# Patient Record
Sex: FEMALE | Race: OTHER | NOT HISPANIC OR LATINO
[De-identification: names, ages, dates, MRNs, and addresses within clinical notes are randomized per-mention and may not be internally consistent; named-entity substitution may affect disease eponyms.]

---

## 2023-08-14 ENCOUNTER — APPOINTMENT (OUTPATIENT)
Dept: UROLOGY | Facility: CLINIC | Age: 38
End: 2023-08-14
Payer: COMMERCIAL

## 2023-08-14 VITALS
OXYGEN SATURATION: 98 % | HEART RATE: 75 BPM | WEIGHT: 130 LBS | TEMPERATURE: 98.2 F | DIASTOLIC BLOOD PRESSURE: 77 MMHG | BODY MASS INDEX: 23.92 KG/M2 | SYSTOLIC BLOOD PRESSURE: 130 MMHG | HEIGHT: 62 IN

## 2023-08-14 DIAGNOSIS — Z78.9 OTHER SPECIFIED HEALTH STATUS: ICD-10-CM

## 2023-08-14 DIAGNOSIS — Z80.0 FAMILY HISTORY OF MALIGNANT NEOPLASM OF DIGESTIVE ORGANS: ICD-10-CM

## 2023-08-14 DIAGNOSIS — Z86.59 PERSONAL HISTORY OF OTHER MENTAL AND BEHAVIORAL DISORDERS: ICD-10-CM

## 2023-08-14 DIAGNOSIS — Z87.09 PERSONAL HISTORY OF OTHER DISEASES OF THE RESPIRATORY SYSTEM: ICD-10-CM

## 2023-08-14 DIAGNOSIS — R39.11 HESITANCY OF MICTURITION: ICD-10-CM

## 2023-08-14 DIAGNOSIS — M62.89 OTHER SPECIFIED DISORDERS OF MUSCLE: ICD-10-CM

## 2023-08-14 DIAGNOSIS — Z22.39 CARRIER OF OTHER SPECIFIED BACTERIAL DISEASES: ICD-10-CM

## 2023-08-14 PROBLEM — Z00.00 ENCOUNTER FOR PREVENTIVE HEALTH EXAMINATION: Status: ACTIVE | Noted: 2023-08-14

## 2023-08-14 LAB
BILIRUB UR QL STRIP: NORMAL
CLARITY UR: CLEAR
COLLECTION METHOD: NORMAL
GLUCOSE UR-MCNC: NORMAL
HCG UR QL: 0.2 EU/DL
HGB UR QL STRIP.AUTO: NORMAL
KETONES UR-MCNC: NORMAL
LEUKOCYTE ESTERASE UR QL STRIP: NORMAL
NITRITE UR QL STRIP: NORMAL
PH UR STRIP: 5.5
PROT UR STRIP-MCNC: NORMAL
SP GR UR STRIP: 1

## 2023-08-14 PROCEDURE — 99204 OFFICE O/P NEW MOD 45 MIN: CPT

## 2023-08-14 PROCEDURE — 81003 URINALYSIS AUTO W/O SCOPE: CPT | Mod: QW

## 2023-08-14 RX ORDER — FLUTICASONE PROPIONATE 50 UG/1
SPRAY, METERED NASAL
Refills: 0 | Status: ACTIVE | COMMUNITY

## 2023-08-21 LAB
MYCOPLASMA HOMINIS CULTURE: NEGATIVE
UREAPLASMA CULTURE: NEGATIVE

## 2023-08-21 NOTE — HISTORY OF PRESENT ILLNESS
[FreeTextEntry1] : Language: English Date of First visit: 2023 Accompanied by: *** Contact info: *** Referring Provider/PCP: Dr. Jones ([facility]) Phone [phone] fax: [phone]   Pronoun: THEY    CC/ Problem List:  =============================================================================== FIRST VISIT: The patient is a 38 year male (born biologically female)  who first presents 2023 for a ureaplasma infection.  The patient reports they were having itching and discomfort and urethral swelling. The patient was treated with abx (two different types). The patient thinks things are better but there can be some ebbing and flowing and the patient is worried about having sex with their partner. The patient thought they had discharge from the urethra after taking abx. the patient had a full spectrum of STI tests last year and they are monogamous with their partner.  The patient also has some hesitancy, sensation of incomplete emptying. They deny straining to void. They do not have problems with constipation. They deny pain with intercourse and urgency. They have nocturia 1x/night. They underwent a uterine ablation and they no longer get a period. They had really "unruly" periods.  their mom had a menstrual issue as well that resulted in hysterectomy.  ------------------------------------------------------------------------------------------- INTERVAL VISITS:   ===============================================================================  PMH: Anxiety / depression PSH: Tonsils, Lipoma, Uterine ablation POBH: (if applicable) ; no longer gets period due to ablation FH:   ALL: Tobramycin, mold dander, dust, grass, tree pollen MEDS: Flonase, B12, D3 SOC: No Tob, Social EtOH, Marijuana   ROS: Review of Systems is as per HPI unless otherwise denoted below   =============================================================================== DATA:   LABS:------------------------------------------------------------------------------------------------------------------- 2023: UA dip trace blood, Neg NIT/LE   RADS:-------------------------------------------------------------------------------------------------------------------    PATHOLOGY/CYTOLOGY:-------------------------------------------------------------------------------------------    VOIDING STUDIES: ---------------------------------------------------------------------------------------------------- 2023: PVR 17cc   STONE STUDIES: (Analysis/LLSA)----------------------------------------------------------------------------------    PROCEDURES: -----------------------------------------------------------------------------------------------     ===============================================================================  PHYSICAL EXAM:  GEN: AAOx3, NAD, Habitus: Normal  BARRIERS to CARE: None  PSYCH: Appropriate Behavior, Affect Congruent  HEENT: AT/NC Trachea midline.   Lungs: No labored breathing.  NEURO: + Movement, all 4 extremities grossly intact without deficits. No tremors.  SKIN: Warm dry. No visible rashes or ulcers  GAIT: Gait normal , Stability good  ABD: Soft, NT ND. No rebound, No guarding. No masses.  No suprapubic tenderness,  MSK: No CVAT BL  EXTR: No cyanosis or edema. Normal hair distribution. No venous stasis changes.  LAD: No palpable pre-auricular, submental, submandibular, or cervical LAD.    FOCUSED: -----------------------------------------------------------------------------------------------  In compliance with [facility] policy the patient was offered a chaperone during this exam The patient DID accept a chaperone The person present for this entire exam/procedure as chaperone was: MALU Mcintyre  Bladder: Nondistended, Nontender, No masses  Ext Genitalia: Normal  Urethra: Normal, no masses, no scarring or prolapse; No hypermobility  Vagina: Normal angle, Normal Length; mildly tight levators, NT NT OI; strong kegel, some billowing of perineal body on Valsalva  Cystocele: None  Rectocele: None  =======================================================================================      ASSESSMENT and PLAN   The patient is a 38 year male with a history of the followin. Ureaplasma infection The patient states their infection was symptomatic and has intermittent but much milder symptoms now. They will find out what type of abx they took in the past and we will send their urine for a U/M culture. We can retreat based on results and prior treatment if needed/symptomatic.  2. Urinary hesitancy and sensation of incomplete emptying without constipation or pain with intercourse Their exam showed some somewhat tight levators and a very strong kegel. This could be part of their issue, though it may not be the biggest problem. I recommended PF PT for possible PFD  The patient and I discussed what the pelvic floor is and what pelvic floor dysfunction is.   I explained that the pelvic floor is a group of muscles that links the "front" of your core (abs, obliques) to the "back" of your core (lats, erectors). It is a group of muscles that most people have poor awareness of. It includes (anteriorly) the superficial and deep transverse perineals, the ischiocavernosus, and bulbospongiosus. Posteriorly it includes the levators, pubococcygeus, iliococcygeus, coccygeus, and levators.  Because the urethra, vagina and rectum traverse these muscles, non-relaxation of the pelvic floor can cause vaginal, urinary and bowel issues. Additionally the bladder sits on top of the pelvic floor and can be affected by PF abnormalities. And because these muscles also have bony attachments, PFD can cause lower back pelvic, suprapubic and hip pain.  While many patients have PFD for unclear reasons, some patients have a h/o horseback riding or sexual abuse/assault. In many patients, the condition of PFD may precede symptoms by quite some time.  Treatment for PFD is multimodal. It almost always requires physical therapy and biofeedback. Intravaginal relaxing agents and trigger point injections as well as stress reduction and bowel regimens can be used as adjuncts. Treatment may take quite some time and requires patience: the patient must first learn the muscles affected and then work on relaxing them. There are multiple specialists that can be involved in this treatment.  I have recommended the following for the patient:  . Pelvic Floor PT If the patient does not improve with PF PT and is significantly bothered we can consider UDs since their issues are primarily bladder /voiding related.  The patient understood the above and their questions were answered.    ----------------------------------------------------------------------------------------------------- LABS/TESTS Ordered: Ureaplasma/Myco, PF PT Meds Ordered: Follow up: VV next week -----------------------------------------------------------------------------------------------------   The total amount of time I have personally spent preparing for this visit, reviewing the patient's test results, obtaining external history, ordering tests/medications, documenting clinical information, communicating with and counseling the patient/family and/or caregiver(s), and spent face to face with the patient explaining the above was  50 minutes.  Thank you for allowing me to assist in the care of your patient. Should you have any questions please do not hesitate to reach out to me.     Liana Shelby MD  Department of Urology [facility] Phone: [phone] Fax: [phone] [address] [address]

## 2023-08-24 ENCOUNTER — APPOINTMENT (OUTPATIENT)
Dept: UROLOGY | Facility: CLINIC | Age: 38
End: 2023-08-24

## 2023-08-24 NOTE — HISTORY OF PRESENT ILLNESS
[FreeTextEntry1] : Language: English Date of First visit: 2023 Accompanied by: *** Contact info: *** Referring Provider/PCP: Dr. Jones ([facility]) Phone [phone] fax: [phone]   Pronoun: THEY    CC/ Problem List:  =============================================================================== FIRST VISIT: The patient was a 38 year male (born biologically female) who first presented on 2023 for a ureaplasma infection.  The patient reports they were having itching and discomfort and urethral swelling. The patient was treated with abx (two different types). The patient thinks things are better but there can be some ebbing and flowing and the patient is worried about having sex with their partner. The patient thought they had discharge from the urethra after taking abx. the patient had a full spectrum of STI tests last year and they are monogamous with their partner.  The patient also has some hesitancy, sensation of incomplete emptying. They deny straining to void. They do not have problems with constipation. They deny pain with intercourse and urgency. They have nocturia 1x/night. They underwent a uterine ablation and they no longer get a period. They had really "unruly" periods.  their mom had a menstrual issue as well that resulted in hysterectomy.  ------------------------------------------------------------------------------------------- INTERVAL VISITS:  The patient's age today 2023 is 38 year old. Please note interval events and changes in PMH, PSH, MEDS and ALLERGIES were reviewed. Their ureaplasma/mycoplasma test returned negative.  ===============================================================================  PMH: Anxiety / depression PSH: Tonsils, Lipoma, Uterine ablation POBH: (if applicable) ; no longer gets period due to ablation FH:   ALL: Tobramycin, mold dander, dust, grass, tree pollen MEDS: Flonase, B12, D3 SOC: No Tob, Social EtOH, Marijuana   ROS: Review of Systems is as per HPI unless otherwise denoted below   =============================================================================== DATA:   LABS:------------------------------------------------------------------------------------------------------------------- 2023: UA dip trace blood, Neg NIT/LE   RADS:-------------------------------------------------------------------------------------------------------------------    PATHOLOGY/CYTOLOGY:-------------------------------------------------------------------------------------------    VOIDING STUDIES: ---------------------------------------------------------------------------------------------------- 2023: PVR 17cc   STONE STUDIES: (Analysis/LLSA)----------------------------------------------------------------------------------    PROCEDURES: -----------------------------------------------------------------------------------------------     ===============================================================================  PHYSICAL EXAM:  GEN: AAOx3, NAD, Habitus: Normal  BARRIERS to CARE: None  PSYCH: Appropriate Behavior, Affect Congruent  HEENT: AT/NC Trachea midline.   Lungs: No labored breathing.  NEURO: + Movement, all 4 extremities grossly intact without deficits. No tremors.  SKIN: Warm dry. No visible rashes or ulcers  GAIT: Gait normal , Stability good  ABD: Soft, NT ND. No rebound, No guarding. No masses.  No suprapubic tenderness,  MSK: No CVAT BL  EXTR: No cyanosis or edema. Normal hair distribution. No venous stasis changes.  LAD: No palpable pre-auricular, submental, submandibular, or cervical LAD.    FOCUSED: -----------------------------------------------------------------------------------------------  In compliance with [facility] policy the patient was offered a chaperone during this exam The patient DID accept a chaperone The person present for this entire exam/procedure as chaperone was: MALU Mcintyre  Bladder: Nondistended, Nontender, No masses  Ext Genitalia: Normal  Urethra: Normal, no masses, no scarring or prolapse; No hypermobility  Vagina: Normal angle, Normal Length; mildly tight levators, NT NT OI; strong kegel, some billowing of perineal body on Valsalva  Cystocele: None  Rectocele: None  =======================================================================================      ASSESSMENT and PLAN   The patient is a 38 year male with a history of the followin. Ureaplasma infection The patient states their infection was symptomatic and has intermittent but much milder symptoms now. They will find out what type of abx they took in the past and we will send their urine for a U/M culture. We can retreat based on results and prior treatment if needed/symptomatic.  2. Urinary hesitancy and sensation of incomplete emptying without constipation or pain with intercourse Their exam showed some somewhat tight levators and a very strong kegel. This could be part of their issue, though it may not be the biggest problem. I recommended PF PT for possible PFD  The patient and I discussed what the pelvic floor is and what pelvic floor dysfunction is.   I explained that the pelvic floor is a group of muscles that links the "front" of your core (abs, obliques) to the "back" of your core (lats, erectors). It is a group of muscles that most people have poor awareness of. It includes (anteriorly) the superficial and deep transverse perineals, the ischiocavernosus, and bulbospongiosus. Posteriorly it includes the levators, pubococcygeus, iliococcygeus, coccygeus, and levators.  Because the urethra, vagina and rectum traverse these muscles, non-relaxation of the pelvic floor can cause vaginal, urinary and bowel issues. Additionally the bladder sits on top of the pelvic floor and can be affected by PF abnormalities. And because these muscles also have bony attachments, PFD can cause lower back pelvic, suprapubic and hip pain.  While many patients have PFD for unclear reasons, some patients have a h/o horseback riding or sexual abuse/assault. In many patients, the condition of PFD may precede symptoms by quite some time.  Treatment for PFD is multimodal. It almost always requires physical therapy and biofeedback. Intravaginal relaxing agents and trigger point injections as well as stress reduction and bowel regimens can be used as adjuncts. Treatment may take quite some time and requires patience: the patient must first learn the muscles affected and then work on relaxing them. There are multiple specialists that can be involved in this treatment.  I have recommended the following for the patient:  . Pelvic Floor PT If the patient does not improve with PF PT and is significantly bothered we can consider UDs since their issues are primarily bladder /voiding related.  The patient understood the above and their questions were answered.    ----------------------------------------------------------------------------------------------------- LABS/TESTS Ordered: Ureaplasma/Myco, PF PT Meds Ordered: Follow up: VV next week -----------------------------------------------------------------------------------------------------   The total amount of time I have personally spent preparing for this visit, reviewing the patient's test results, obtaining external history, ordering tests/medications, documenting clinical information, communicating with and counseling the patient/family and/or caregiver(s), and spent face to face with the patient explaining the above was  50 minutes.  Thank you for allowing me to assist in the care of your patient. Should you have any questions please do not hesitate to reach out to me.     Liana Shelby MD  Department of Urology [facility] Phone: [phone] Fax: [phone] [address] [address]

## 2023-09-03 ENCOUNTER — TRANSCRIPTION ENCOUNTER (OUTPATIENT)
Age: 38
End: 2023-09-03

## 2023-09-29 ENCOUNTER — APPOINTMENT (OUTPATIENT)
Dept: NEUROLOGY | Facility: CLINIC | Age: 38
End: 2023-09-29

## 2023-11-02 ENCOUNTER — APPOINTMENT (OUTPATIENT)
Dept: NEUROLOGY | Facility: CLINIC | Age: 38
End: 2023-11-02
Payer: COMMERCIAL

## 2023-11-02 VITALS
HEIGHT: 62 IN | TEMPERATURE: 98 F | SYSTOLIC BLOOD PRESSURE: 129 MMHG | DIASTOLIC BLOOD PRESSURE: 80 MMHG | HEART RATE: 61 BPM | OXYGEN SATURATION: 99 % | BODY MASS INDEX: 24.84 KG/M2 | WEIGHT: 135 LBS

## 2023-11-02 DIAGNOSIS — D32.9 BENIGN NEOPLASM OF MENINGES, UNSPECIFIED: ICD-10-CM

## 2023-11-02 PROCEDURE — 99204 OFFICE O/P NEW MOD 45 MIN: CPT

## 2023-11-06 ENCOUNTER — TRANSCRIPTION ENCOUNTER (OUTPATIENT)
Age: 38
End: 2023-11-06

## 2023-11-06 RX ORDER — SUMATRIPTAN 25 MG/1
25 TABLET, FILM COATED ORAL
Qty: 9 | Refills: 3 | Status: ACTIVE | COMMUNITY
Start: 2023-11-02 | End: 1900-01-01

## 2023-11-10 ENCOUNTER — APPOINTMENT (OUTPATIENT)
Dept: MRI IMAGING | Facility: HOSPITAL | Age: 38
End: 2023-11-10

## 2023-11-10 ENCOUNTER — OUTPATIENT (OUTPATIENT)
Dept: OUTPATIENT SERVICES | Facility: HOSPITAL | Age: 38
LOS: 1 days | End: 2023-11-10
Payer: COMMERCIAL

## 2023-11-10 PROCEDURE — 70553 MRI BRAIN STEM W/O & W/DYE: CPT | Mod: 26

## 2023-11-10 PROCEDURE — 70553 MRI BRAIN STEM W/O & W/DYE: CPT

## 2023-11-10 PROCEDURE — A9585: CPT

## 2023-11-20 ENCOUNTER — TRANSCRIPTION ENCOUNTER (OUTPATIENT)
Age: 38
End: 2023-11-20

## 2023-11-27 ENCOUNTER — NON-APPOINTMENT (OUTPATIENT)
Age: 38
End: 2023-11-27

## 2023-11-27 ENCOUNTER — TRANSCRIPTION ENCOUNTER (OUTPATIENT)
Age: 38
End: 2023-11-27

## 2023-12-11 ENCOUNTER — APPOINTMENT (OUTPATIENT)
Dept: NEUROLOGY | Facility: CLINIC | Age: 38
End: 2023-12-11

## 2024-03-04 ENCOUNTER — APPOINTMENT (OUTPATIENT)
Dept: NEUROLOGY | Facility: CLINIC | Age: 39
End: 2024-03-04
Payer: COMMERCIAL

## 2024-03-04 VITALS
HEART RATE: 84 BPM | TEMPERATURE: 98.8 F | SYSTOLIC BLOOD PRESSURE: 133 MMHG | OXYGEN SATURATION: 100 % | BODY MASS INDEX: 24.84 KG/M2 | WEIGHT: 135 LBS | HEIGHT: 62 IN | DIASTOLIC BLOOD PRESSURE: 82 MMHG

## 2024-03-04 DIAGNOSIS — G43.E09 CHRONIC MIGRAINE WITH AURA, NOT INTRACTABLE, WITHOUT STATUS MIGRAINOSUS: ICD-10-CM

## 2024-03-04 PROCEDURE — G2211 COMPLEX E/M VISIT ADD ON: CPT

## 2024-03-04 PROCEDURE — 99213 OFFICE O/P EST LOW 20 MIN: CPT

## 2024-03-04 NOTE — DISCUSSION/SUMMARY
[FreeTextEntry1] : 37 yo person with seasonal allergies, migraines presents for management of chronic migraine w/ aura   plan: Amitriptyline 10mg/night as preventative headache medication  Trial of sumatriptan PRN  MRI brain w/wout contrast to reassess meningioma Discussed lifestyle factors and increased stroke risk F/u in 4-5 months or sooner if needed

## 2024-03-04 NOTE — DATA REVIEWED
[de-identified] : INTERPRETATION:  CLINICAL INDICATIONS: hx of meningioma;, migraines with aura  COMPARISON: None.  TECHNIQUE: MRI brain: Multiplanar, multisequence MR imaging of the brain are obtained with and without the administration of 6. cc intravenous Gadavist contrast. 1.0 cc of contrast was discarded  FINDINGS: No abnormal leptomeningeal or parenchymal enhancement. Moderate size cava septum pellucidum and cavum vergae. No vasogenic edema. There is no abnormal restricted diffusion to suggest acute infarction. No abnormal signal is demonstrated throughout the brain parenchyma. Normal T2 flow-voids are seen within  the intracranial vasculature. The lateral ventricles and cortical sulci are otherwise age-appropriate in size and configuration. There is no mass, mass effect, or extra-axial fluid collection. There is no susceptibility artifact to suggest hemorrhage. Midline structures are normal.  The visualized paranasal sinuses, mastoid air cells and orbits are unremarkable.   IMPRESSION: Unremarkable MRI of the brain.  --- End of Report ---      GRETCHEN GALVAN MD; Attending Radiologist This document has been electronically signed. Nov 11 2023 10:43AM	 		 		 _________________________	 Exam requested by: DR MECHELLE MEDINA MD [address] [place] [address] SITE PERFORMED: [facility] PHONE: [phone] Patient: ANGELINE VERDUGO YOB: 1985 Phone: [phone] MRN: [id number] Acc: [id number] Date of Exam: 09-   EXAM:  MRI BRAIN WITHOUT CONTRAST  HISTORY:  Headaches   TECHNIQUE:  MRI of the brain was performed utilizing multiplanar sequences.  COMPARISON:  There are no prior studies available for comparison.  FINDINGS:   The ventricles and sulci are normal in size and configuration. There is a cavum septum pellucida and vergae, consistent with normal anatomic variation. There is no acute intracranial hemorrhage, extra-axial fluid collection, acute infarct or intraparenchymal mass lesion. There is a small calcification versus calcified meningioma along the high left tentorial leaflet.  The arterial flow voids at the skull base are unremarkable. The pituitary gland is not enlarged. The cerebellar tonsils are normal in position.  IMPRESSION:  No acute intracranial hemorrhage, extra-axial fluid collection, acute infarct or intraparenchymal mass lesion.   Small calcification versus calcified meningioma along the high left tentorial leaflet.  Thank you for the opportunity to participate in the care of this patient.     RADHA GAYTAN MD  - Electronically Signed: 09- 2:46 PM  Physician to Physician Direct Line is: [phone]  	 Exam requested by: DR MECHELLE MEDINA MD [address] [place] [address] SITE PERFORMED: [facility] PHONE: [phone] Patient: ANGELINE VERDUGO YOB: 1985 Phone: [phone] MRN: [id number] Acc: [id number] Date of Exam: 09-   EXAM:  MRI BRAIN WITHOUT CONTRAST  HISTORY:  Headaches   TECHNIQUE:  MRI of the brain was performed utilizing multiplanar sequences.  COMPARISON:  There are no prior studies available for comparison.  FINDINGS:   The ventricles and sulci are normal in size and configuration. There is a cavum septum pellucida and vergae, consistent with normal anatomic variation. There is no acute intracranial hemorrhage, extra-axial fluid collection, acute infarct or intraparenchymal mass lesion. There is a small calcification versus calcified meningioma along the high left tentorial leaflet.  The arterial flow voids at the skull base are unremarkable. The pituitary gland is not enlarged. The cerebellar tonsils are normal in position.  IMPRESSION:  No acute intracranial hemorrhage, extra-axial fluid collection, acute infarct or intraparenchymal mass lesion.   Small calcification versus calcified meningioma along the high left tentorial leaflet.  Thank you for the opportunity to participate in the care of this patient.     RADHA GAYTAN MD  - Electronically Signed: 09- 2:46 PM  Physician to Physician Direct Line is: [phone] 	 	 Exam requested by: DR MECHELLE MEDINA MD [address] [place], [place] [id number] SITE PERFORMED: [facility] PHONE: [phone] Patient: ANGELINE VERDUGO YOB: 1985 Phone: [phone] MRN: [id number] Acc: [id number] Date of Exam: 09-   EXAM:  MRI BRAIN WITHOUT CONTRAST  HISTORY:  Headaches   TECHNIQUE:  MRI of the brain was performed utilizing multiplanar sequences.  COMPARISON:  There are no prior studies available for comparison.  FINDINGS:   The ventricles and sulci are normal in size and configuration. There is a cavum septum pellucida and vergae, consistent with normal anatomic variation. There is no acute intracranial hemorrhage, extra-axial fluid collection, acute infarct or intraparenchymal mass lesion. There is a small calcification versus calcified meningioma along the high left tentorial leaflet.  The arterial flow voids at the skull base are unremarkable. The pituitary gland is not enlarged. The cerebellar tonsils are normal in position.  IMPRESSION:  No acute intracranial hemorrhage, extra-axial fluid collection, acute infarct or intraparenchymal mass lesion.   Small calcification versus calcified meningioma along the high left tentorial leaflet.  Thank you for the opportunity to participate in the care of this patient.     RADHA GAYTAN MD  - Electronically Signed: 09- 2:46 PM  Physician to Physician Direct Line is: [phone]

## 2024-03-04 NOTE — HISTORY OF PRESENT ILLNESS
[FreeTextEntry1] : Interim Hx: 3/4/2024  Doing well. Started amitriptyline 10mg/night, no side effects. Migraines have been infrequent, has not needed sumatriptan  MRI brain completed  ________________ Initial Hx: 11/2/2023 39 yo person with seasonal allergies, migraines presents for migraine management pronouns- they/them  migraines started at ~14 years old  variable pain, typically top of head Frequency- migraine 1x/month,  worse in spring and fall, "regular headaches" 3x/week Visual aura- visual obscurations before migraine Duration- hours to 1-2 days a/w light, noise sensitivity, and recently nausea trigger- weather  Excedrin usually helps preventative med trial amitriptyline  for 7 months  which was effective but stopped b/c no longer seeing that neurologist (has been off for about a year)  sleep - avg 7 hours/night water intake- good no skipping meals Stressed++  No known FHx of migraines  Was following with Neurologist in the past MRI brain how showed possible meningioma  Social Hx:  smoke marijuana, occasional alcohol

## 2024-03-04 NOTE — DATA REVIEWED
[de-identified] :  	 		 		 	 Exam requested by: DR MECHELLE MEDINA MD [address] [place] [address] SITE PERFORMED: [facility] PHONE: [phone] Patient: ANGELINE VERDUGO YOB: 1985 Phone: [phone] MRN: [id number] Acc: [id number] Date of Exam: 09-   EXAM:  MRI BRAIN WITHOUT CONTRAST  HISTORY:  Headaches   TECHNIQUE:  MRI of the brain was performed utilizing multiplanar sequences.  COMPARISON:  There are no prior studies available for comparison.  FINDINGS:   The ventricles and sulci are normal in size and configuration. There is a cavum septum pellucida and vergae, consistent with normal anatomic variation. There is no acute intracranial hemorrhage, extra-axial fluid collection, acute infarct or intraparenchymal mass lesion. There is a small calcification versus calcified meningioma along the high left tentorial leaflet.  The arterial flow voids at the skull base are unremarkable. The pituitary gland is not enlarged. The cerebellar tonsils are normal in position.  IMPRESSION:  No acute intracranial hemorrhage, extra-axial fluid collection, acute infarct or intraparenchymal mass lesion.   Small calcification versus calcified meningioma along the high left tentorial leaflet.  Thank you for the opportunity to participate in the care of this patient.     RADHA GAYTAN MD  - Electronically Signed: 09- 2:46 PM  Physician to Physician Direct Line is: [phone]  	 Exam requested by: DR MECHELLE MEDINA MD [address] [place], [place] [id number] SITE PERFORMED: [facility] PHONE: [phone] Patient: ANGELINE VERDUGO YOB: 1985 Phone: [phone] MRN: [id number] Acc: [id number] Date of Exam: 09-   EXAM:  MRI BRAIN WITHOUT CONTRAST  HISTORY:  Headaches   TECHNIQUE:  MRI of the brain was performed utilizing multiplanar sequences.  COMPARISON:  There are no prior studies available for comparison.  FINDINGS:   The ventricles and sulci are normal in size and configuration. There is a cavum septum pellucida and vergae, consistent with normal anatomic variation. There is no acute intracranial hemorrhage, extra-axial fluid collection, acute infarct or intraparenchymal mass lesion. There is a small calcification versus calcified meningioma along the high left tentorial leaflet.  The arterial flow voids at the skull base are unremarkable. The pituitary gland is not enlarged. The cerebellar tonsils are normal in position.  IMPRESSION:  No acute intracranial hemorrhage, extra-axial fluid collection, acute infarct or intraparenchymal mass lesion.   Small calcification versus calcified meningioma along the high left tentorial leaflet.  Thank you for the opportunity to participate in the care of this patient.     RADHA GAYTAN MD  - Electronically Signed: 09- 2:46 PM  Physician to Physician Direct Line is: [phone] 	 	 Exam requested by: DR MECHELLE MEDINA MD [address] [place] [address] SITE PERFORMED: [facility] PHONE: [phone] Patient: ANGELINE VERDUGO YOB: 1985 Phone: [phone] MRN: [id number] Acc: [id number] Date of Exam: 09-   EXAM:  MRI BRAIN WITHOUT CONTRAST  HISTORY:  Headaches   TECHNIQUE:  MRI of the brain was performed utilizing multiplanar sequences.  COMPARISON:  There are no prior studies available for comparison.  FINDINGS:   The ventricles and sulci are normal in size and configuration. There is a cavum septum pellucida and vergae, consistent with normal anatomic variation. There is no acute intracranial hemorrhage, extra-axial fluid collection, acute infarct or intraparenchymal mass lesion. There is a small calcification versus calcified meningioma along the high left tentorial leaflet.  The arterial flow voids at the skull base are unremarkable. The pituitary gland is not enlarged. The cerebellar tonsils are normal in position.  IMPRESSION:  No acute intracranial hemorrhage, extra-axial fluid collection, acute infarct or intraparenchymal mass lesion.   Small calcification versus calcified meningioma along the high left tentorial leaflet.  Thank you for the opportunity to participate in the care of this patient.     RADHA GAYTAN MD  - Electronically Signed: 09- 2:46 PM  Physician to Physician Direct Line is: [phone]

## 2024-03-04 NOTE — PHYSICAL EXAM
[General Appearance - Alert] : alert [General Appearance - In No Acute Distress] : in no acute distress [Oriented To Time, Place, And Person] : oriented to person, place, and time [Person] : oriented to person [Place] : oriented to place [Time] : oriented to time [Fluency] : fluency intact [Cranial Nerves Optic (II)] : visual acuity intact bilaterally,  visual fields full to confrontation, pupils equal round and reactive to light [Cranial Nerves Oculomotor (III)] : extraocular motion intact [Cranial Nerves Trigeminal (V)] : facial sensation intact symmetrically [Cranial Nerves Facial (VII)] : face symmetrical [Cranial Nerves Vestibulocochlear (VIII)] : hearing was intact bilaterally [Cranial Nerves Accessory (XI - Cranial And Spinal)] : head turning and shoulder shrug symmetric [Motor Tone] : muscle tone was normal in all four extremities [Motor Strength] : muscle strength was normal in all four extremities [Sensation Tactile Decrease] : light touch was intact [Abnormal Walk] : normal gait [Coordination - Dysmetria Impaired Finger-to-Nose Bilateral] : not present

## 2024-03-04 NOTE — DISCUSSION/SUMMARY
[FreeTextEntry1] : 40 yo person with seasonal allergies, migraines presents for management of chronic migraine w/ aura   plan: continue Amitriptyline 10mg/night as preventative headache medication Sumatriptan PRN  F/u in 6 months or sooner if needed

## 2024-03-04 NOTE — HISTORY OF PRESENT ILLNESS
[FreeTextEntry1] : 39 yo person with seasonal allergies, migraines presents for migraine management pronouns- they/them  migraines started at ~14 years old  variable pain, typically top of head Frequency- migraine 1x/month,  worse in spring and fall, "regular headaches" 3x/week Visual aura- visual obscurations before migraine Duration- hours to 1-2 days a/w light, noise sensitivity, and recently nausea trigger- weather  Excedrin usually helps preventative med trial amitriptyline  for 7 months  which was effective but stopped b/c no longer seeing that neurologist (has been off for about a year)  sleep - avg 7 hours/night water intake- good no skipping meals Stressed++  No known FHx of migraines  Was following with Neurologist in the past MRI brain how showed possible meningioma  Social Hx:  smoke marijuana, occasional alcohol

## 2024-05-31 ENCOUNTER — TRANSCRIPTION ENCOUNTER (OUTPATIENT)
Age: 39
End: 2024-05-31

## 2024-05-31 RX ORDER — AMITRIPTYLINE HYDROCHLORIDE 10 MG/1
10 TABLET, FILM COATED ORAL
Qty: 90 | Refills: 1 | Status: ACTIVE | COMMUNITY
Start: 2023-11-02 | End: 1900-01-01

## 2025-02-03 ENCOUNTER — NON-APPOINTMENT (OUTPATIENT)
Age: 40
End: 2025-02-03

## 2025-02-03 ENCOUNTER — APPOINTMENT (OUTPATIENT)
Dept: NEUROLOGY | Facility: CLINIC | Age: 40
End: 2025-02-03
Payer: COMMERCIAL

## 2025-02-03 VITALS
TEMPERATURE: 97.7 F | BODY MASS INDEX: 25.03 KG/M2 | SYSTOLIC BLOOD PRESSURE: 121 MMHG | HEIGHT: 62 IN | HEART RATE: 68 BPM | DIASTOLIC BLOOD PRESSURE: 79 MMHG | WEIGHT: 136 LBS | OXYGEN SATURATION: 96 %

## 2025-02-03 DIAGNOSIS — G43.E09 CHRONIC MIGRAINE WITH AURA, NOT INTRACTABLE, WITHOUT STATUS MIGRAINOSUS: ICD-10-CM

## 2025-02-03 PROCEDURE — 99213 OFFICE O/P EST LOW 20 MIN: CPT

## 2025-02-03 PROCEDURE — G2211 COMPLEX E/M VISIT ADD ON: CPT | Mod: NC
